# Patient Record
Sex: MALE | Race: WHITE | NOT HISPANIC OR LATINO
[De-identification: names, ages, dates, MRNs, and addresses within clinical notes are randomized per-mention and may not be internally consistent; named-entity substitution may affect disease eponyms.]

---

## 2020-05-07 PROBLEM — Z00.00 ENCOUNTER FOR PREVENTIVE HEALTH EXAMINATION: Status: ACTIVE | Noted: 2020-05-07

## 2020-05-11 ENCOUNTER — APPOINTMENT (OUTPATIENT)
Dept: ORTHOPEDIC SURGERY | Facility: CLINIC | Age: 65
End: 2020-05-11
Payer: MEDICARE

## 2020-05-11 VITALS
WEIGHT: 165 LBS | DIASTOLIC BLOOD PRESSURE: 98 MMHG | BODY MASS INDEX: 24.44 KG/M2 | HEART RATE: 80 BPM | SYSTOLIC BLOOD PRESSURE: 134 MMHG | HEIGHT: 69 IN

## 2020-05-11 DIAGNOSIS — Z85.9 PERSONAL HISTORY OF MALIGNANT NEOPLASM, UNSPECIFIED: ICD-10-CM

## 2020-05-11 DIAGNOSIS — S72.111A DISPLACED FRACTURE OF GREATER TROCHANTER OF RIGHT FEMUR, INITIAL ENCOUNTER FOR CLOSED FRACTURE: ICD-10-CM

## 2020-05-11 PROCEDURE — 99204 OFFICE O/P NEW MOD 45 MIN: CPT

## 2024-01-03 ENCOUNTER — APPOINTMENT (OUTPATIENT)
Dept: INTERNAL MEDICINE | Facility: CLINIC | Age: 69
End: 2024-01-03

## 2024-01-05 ENCOUNTER — APPOINTMENT (OUTPATIENT)
Dept: ORTHOPEDIC SURGERY | Facility: CLINIC | Age: 69
End: 2024-01-05

## 2024-01-12 ENCOUNTER — APPOINTMENT (OUTPATIENT)
Dept: ORTHOPEDIC SURGERY | Facility: CLINIC | Age: 69
End: 2024-01-12

## 2024-01-17 ENCOUNTER — APPOINTMENT (OUTPATIENT)
Dept: ORTHOPEDIC SURGERY | Facility: CLINIC | Age: 69
End: 2024-01-17

## 2024-01-24 ENCOUNTER — APPOINTMENT (OUTPATIENT)
Dept: ORTHOPEDIC SURGERY | Facility: CLINIC | Age: 69
End: 2024-01-24

## 2024-01-29 ENCOUNTER — APPOINTMENT (OUTPATIENT)
Dept: ORTHOPEDIC SURGERY | Facility: CLINIC | Age: 69
End: 2024-01-29

## 2024-01-30 ENCOUNTER — APPOINTMENT (OUTPATIENT)
Dept: ORTHOPEDIC SURGERY | Facility: CLINIC | Age: 69
End: 2024-01-30
Payer: MEDICARE

## 2024-01-30 DIAGNOSIS — M54.50 LOW BACK PAIN, UNSPECIFIED: ICD-10-CM

## 2024-01-30 PROCEDURE — 72110 X-RAY EXAM L-2 SPINE 4/>VWS: CPT

## 2024-01-30 PROCEDURE — 99204 OFFICE O/P NEW MOD 45 MIN: CPT

## 2024-02-05 PROBLEM — M54.50 LUMBAR BACK PAIN: Status: ACTIVE | Noted: 2024-02-05

## 2024-02-05 NOTE — PHYSICAL EXAM
[de-identified] : General: No acute distress, conversant, well-nourished. Head: Normocephalic, atraumatic Neck: trachea midline, FROM Heart: normotensive and normal rate and rhythm Lungs: No labored breathing Skin: No abrasions, no rashes, no edema Psych: Alert and oriented to person, place and time Extremities: no peripheral edema or digital cyanosis Gait: Normal gait. Can perform tandem gait.   Vascular: warm and well perfused distally, palpable distal pulses  MSK: Lumbar spine: No tenderness to palpation.  No step-off, no deformity.  NEURO EXAM: Sensation  Left L2  -  2/2             Left L3  -  2/2 Left L4  -  2/2 Left L5  -  2/2 Left S1  -  2/2  Right L2  -  2/2             Right L3  -  2/2 Right L4  -  2/2 Right L5  -  2/2 Right S1  -  2/2  Motor:  Left L2 (hip flexion)                            5/5                 Left L3 (knee extension)                   5/5                 Left L4 (ankle dorsiflexion)                 5/5                 Left L5 (long toe extensor)                5/5                 Left S1 (ankle plantar flexion)           5/5  Right L2 (hip flexion)                            5/5                 Right L3 (knee extension)                   5/5                 Right L4 (ankle dorsiflexion)                 5/5                 Right L5 (long toe extensor)                5/5                 Right S1 (ankle plantar flexion)           5/5  Reflexes: Normal and symmetric Negative clonus.  Down-going Babinski.    [de-identified] : I ordered radiographs to evaluate the patient's symptoms. Lumbar 4 view radiographs taken in the office today show no dislocation or fracture.  Lumbar spondylosis.  No instability on dynamic series.  I independently reviewed his lumbar MRI which shows disc herniation and stenosis most pronounced at L3-L4.

## 2024-02-05 NOTE — HISTORY OF PRESENT ILLNESS
[de-identified] : 68 year old male presents with low back pain radiating to his legs left worse than right.  He reports numbness in his left leg. He denies recent illness, fevers, weakness, balance problems, saddle anesthesia, urinary retention or fecal incontinence.  He has recent MRI.  He has been doing PT with minimal relief.

## 2024-02-05 NOTE — ASSESSMENT
[FreeTextEntry1] : 68 year old male presents with low back pain radiating to his legs left worse than right.  He reports numbness in his left leg. He denies recent illness, fevers, weakness, balance problems, saddle anesthesia, urinary retention or fecal incontinence.  I independently reviewed his lumbar MRI which shows disc herniation and stenosis most pronounced at L3-L4. We discussed treatment options including physical therapy, medications, spinal injections and surgery.  Surgery would be lumbar decompression and discectomy.  He would like to avoid surgery. The patient was given a referral for consideration for spinal injections.  The patient was given a referral for physical therapy.  Continue PT.  F/U in 4-6 weeks. We discussed red flag symptoms that would require emergent evaluation. He knows to call with any questions or concerns or if his symptoms acutely worsen.

## 2024-02-08 ENCOUNTER — APPOINTMENT (OUTPATIENT)
Dept: ORTHOPEDIC SURGERY | Facility: CLINIC | Age: 69
End: 2024-02-08
Payer: MEDICARE

## 2024-02-08 VITALS
HEIGHT: 69 IN | BODY MASS INDEX: 24.44 KG/M2 | DIASTOLIC BLOOD PRESSURE: 86 MMHG | TEMPERATURE: 98.2 F | HEART RATE: 73 BPM | SYSTOLIC BLOOD PRESSURE: 125 MMHG | OXYGEN SATURATION: 98 % | WEIGHT: 165 LBS

## 2024-02-08 PROCEDURE — 99204 OFFICE O/P NEW MOD 45 MIN: CPT | Mod: 57

## 2024-02-12 RX ORDER — BUPROPION HYDROCHLORIDE 100 MG/1
TABLET, FILM COATED ORAL
Refills: 0 | Status: ACTIVE | COMMUNITY

## 2024-02-12 NOTE — PLAN
[TextEntry] : I agree with the plan for decompressive laminectomy L3-4. The risks, alternative benefits, which explain detailed patient understanding and wish to proceed.

## 2024-02-12 NOTE — HISTORY OF PRESENT ILLNESS
[de-identified] : Mr. Sears has LBP radiating to b/l LEs Lt>Rt since October 2023. Reports pain may have begun after lifting heavy weights. Pt reports he has had similar prior episodes that would resolved after 1 months or after taking a Medrol channing. He reports he has tried Medrol, mobic and PT with no relief this time. Reports LE weakness.   He's been recommended for surgical decompression.

## 2024-02-12 NOTE — END OF VISIT
[FreeTextEntry3] : I Sol Campos, acting as scribe, attest that this documentation has been prepared under the direction and in the presence of Provider Francisco Arias MD.   I was physically present during the service to the patient and/or personally examined the patient and I was directly involved in the management plan and recommendations of the care provided to the patient.   I Dr. Arias, reviewed the history, the physical exam, and plan as documented by the PA. The documentation recorded by the PA, in my presence, accurately reflects the service I personally performed, and the decisions made by me with my edits as appropriate.  Francisco Arias MD

## 2024-02-12 NOTE — PHYSICAL EXAM
[de-identified] : Lumbar MRI: severe lumbar spinal stenosis at L3-4 with clumping of his nerves consistent with the symptoms. He also has degenerative changes at L4-5 with narrowing of the neuroforamina at both levels.

## 2024-02-22 ENCOUNTER — APPOINTMENT (OUTPATIENT)
Dept: ORTHOPEDIC SURGERY | Facility: CLINIC | Age: 69
End: 2024-02-22

## 2024-02-29 ENCOUNTER — APPOINTMENT (OUTPATIENT)
Dept: ORTHOPEDIC SURGERY | Facility: CLINIC | Age: 69
End: 2024-02-29
Payer: MEDICARE

## 2024-02-29 DIAGNOSIS — Z87.39 PERSONAL HISTORY OF OTHER DISEASES OF THE MUSCULOSKELETAL SYSTEM AND CONNECTIVE TISSUE: ICD-10-CM

## 2024-02-29 DIAGNOSIS — M48.062 SPINAL STENOSIS, LUMBAR REGION WITH NEUROGENIC CLAUDICATION: ICD-10-CM

## 2024-02-29 PROCEDURE — 99212 OFFICE O/P EST SF 10 MIN: CPT

## 2024-02-29 NOTE — REASON FOR VISIT
[Follow-Up Visit] : a follow-up visit for [Herniated Discs] : herniated discs [Back Pain] : back pain [Spinal Stenosis] : spinal stenosis

## 2024-03-01 PROBLEM — M48.062 LUMBAR STENOSIS WITH NEUROGENIC CLAUDICATION: Status: ACTIVE | Noted: 2024-01-30

## 2024-03-01 PROBLEM — Z87.39 HISTORY OF ARTHRITIS: Status: RESOLVED | Noted: 2024-02-08 | Resolved: 2024-03-01

## 2024-03-01 NOTE — PLAN
[TextEntry] : - Await patient's decision to proceed with decompressive laminectomy at L3-4   - Continue to provide necessary information and support to the patient as required   - Plan for pre-operative evaluation if the patient decides to proceed with the surgery.

## 2024-03-01 NOTE — ASSESSMENT
[FreeTextEntry1] : Patient seeking information about decompressive laminectomy, will contact the team when ready to proceed with the procedure.

## 2024-03-01 NOTE — HISTORY OF PRESENT ILLNESS
[de-identified] : Nadir is here to discuss the possibility of undergoing a decompressive laminectomy. He had multiple questions regarding the procedure, including the risks, alternatives, and benefits. After having his queries answered, he will contact us when he is ready to proceed.

## 2024-09-03 ENCOUNTER — APPOINTMENT (OUTPATIENT)
Dept: DERMATOLOGY | Facility: CLINIC | Age: 69
End: 2024-09-03

## 2024-10-23 ENCOUNTER — APPOINTMENT (OUTPATIENT)
Dept: INTERNAL MEDICINE | Facility: CLINIC | Age: 69
End: 2024-10-23

## 2024-11-12 ENCOUNTER — APPOINTMENT (OUTPATIENT)
Dept: INTERNAL MEDICINE | Facility: CLINIC | Age: 69
End: 2024-11-12

## 2024-11-12 ENCOUNTER — TRANSCRIPTION ENCOUNTER (OUTPATIENT)
Age: 69
End: 2024-11-12

## 2024-11-20 ENCOUNTER — APPOINTMENT (OUTPATIENT)
Dept: OTOLARYNGOLOGY | Facility: CLINIC | Age: 69
End: 2024-11-20

## 2024-11-21 ENCOUNTER — APPOINTMENT (OUTPATIENT)
Dept: NEUROSURGERY | Facility: CLINIC | Age: 69
End: 2024-11-21

## 2024-12-19 ENCOUNTER — APPOINTMENT (OUTPATIENT)
Dept: ORTHOPEDIC SURGERY | Facility: CLINIC | Age: 69
End: 2024-12-19
Payer: MEDICARE

## 2024-12-19 DIAGNOSIS — M48.02 SPINAL STENOSIS, CERVICAL REGION: ICD-10-CM

## 2024-12-19 DIAGNOSIS — M48.062 SPINAL STENOSIS, LUMBAR REGION WITH NEUROGENIC CLAUDICATION: ICD-10-CM

## 2024-12-19 PROCEDURE — 99212 OFFICE O/P EST SF 10 MIN: CPT | Mod: 57

## 2024-12-20 PROBLEM — M48.02 SPINAL STENOSIS OF CERVICAL REGION: Status: ACTIVE | Noted: 2024-12-20

## 2025-03-13 ENCOUNTER — APPOINTMENT (OUTPATIENT)
Dept: ORTHOPEDIC SURGERY | Facility: CLINIC | Age: 70
End: 2025-03-13
Payer: MEDICARE

## 2025-03-13 ENCOUNTER — NON-APPOINTMENT (OUTPATIENT)
Age: 70
End: 2025-03-13

## 2025-03-13 VITALS
HEIGHT: 69 IN | OXYGEN SATURATION: 96 % | SYSTOLIC BLOOD PRESSURE: 134 MMHG | BODY MASS INDEX: 22.96 KG/M2 | HEART RATE: 73 BPM | WEIGHT: 155 LBS | DIASTOLIC BLOOD PRESSURE: 69 MMHG

## 2025-03-13 DIAGNOSIS — M48.02 SPINAL STENOSIS, CERVICAL REGION: ICD-10-CM

## 2025-03-13 DIAGNOSIS — M48.062 SPINAL STENOSIS, LUMBAR REGION WITH NEUROGENIC CLAUDICATION: ICD-10-CM

## 2025-03-13 PROCEDURE — 99212 OFFICE O/P EST SF 10 MIN: CPT

## 2025-04-10 ENCOUNTER — TRANSCRIPTION ENCOUNTER (OUTPATIENT)
Age: 70
End: 2025-04-10

## 2025-04-10 ENCOUNTER — APPOINTMENT (OUTPATIENT)
Dept: ORTHOPEDIC SURGERY | Facility: CLINIC | Age: 70
End: 2025-04-10
Payer: MEDICARE

## 2025-04-10 DIAGNOSIS — M48.02 SPINAL STENOSIS, CERVICAL REGION: ICD-10-CM

## 2025-04-10 DIAGNOSIS — M48.062 SPINAL STENOSIS, LUMBAR REGION WITH NEUROGENIC CLAUDICATION: ICD-10-CM

## 2025-04-10 PROCEDURE — 99212 OFFICE O/P EST SF 10 MIN: CPT

## 2025-04-10 RX ORDER — DEXTROAMPHETAMINE SACCHARATE, AMPHETAMINE ASPARTATE, DEXTROAMPHETAMINE SULFATE, AND AMPHETAMINE SULFATE 1.25; 1.25; 1.25; 1.25 MG/1; MG/1; MG/1; MG/1
5 TABLET ORAL
Refills: 0 | Status: ACTIVE | COMMUNITY

## 2025-04-22 ENCOUNTER — TRANSCRIPTION ENCOUNTER (OUTPATIENT)
Age: 70
End: 2025-04-22

## 2025-04-22 VITALS
DIASTOLIC BLOOD PRESSURE: 88 MMHG | SYSTOLIC BLOOD PRESSURE: 151 MMHG | HEART RATE: 69 BPM | WEIGHT: 154.32 LBS | TEMPERATURE: 98 F | HEIGHT: 69 IN | RESPIRATION RATE: 16 BRPM | OXYGEN SATURATION: 98 %

## 2025-04-22 RX ORDER — POVIDONE-IODINE 7.5 %
1 SOLUTION, NON-ORAL TOPICAL ONCE
Refills: 0 | Status: COMPLETED | OUTPATIENT
Start: 2025-04-23 | End: 2025-04-23

## 2025-04-22 NOTE — H&P ADULT - NSICDXPASTSURGICALHX_GEN_ALL_CORE_FT
PAST SURGICAL HISTORY:  H/O: knee surgery     Status post open reduction and internal fixation (ORIF) of fracture humerus    Surgery, elective partial resection, native

## 2025-04-22 NOTE — H&P ADULT - NSHPLABSRESULTS_GEN_ALL_CORE
Preop CBC, BMP, PT/INR, PTT within normal range and reviewed per medical clearance  H/H: 14.1/41.3  Cr: 1.45  UA: WNL  Preop EKG - 74 bmp NSR  3M: DOS  T + S: DOS
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
## 2025-04-22 NOTE — H&P ADULT - PROBLEM SELECTOR PLAN 1
Admit to Ortho for elective limited unilateral laminotomy and decompression at L3-L4  Cleared by Dr. Tobin

## 2025-04-22 NOTE — H&P ADULT - NSHPPHYSICALEXAM_GEN_ALL_CORE
Gen: ***, NAD  MSK: Decreased lumbar ROM secondary to pain    Rest of PE per MD clearance Gen:  NAD  MSK: BILATERAL LES skin intact, no erythema/ecchymosis/sts  SLT INTACT, DP/PT 2+, EHL/TA/GS 5/5  Decreased lumbar ROM secondary to pain    Rest of PE per MD clearance

## 2025-04-22 NOTE — PATIENT PROFILE ADULT - NSPROPTRIGHTCAREGIVER_GEN_A_NUR
After Visit Summary:    - no spots of concern on exam, today  - please call for any new or changing skin concerns     Sun protective hats and clothing look for \"UPF\" (like \"SPF\" but for fabric)  Lands Weibu, [employer], UV skinz, Coolibar, Under [place], other brands as well, can order online (amazon.com,etc)       Sunscreen   is recommended daily (SPF 15-30 okay for daily use on face as below, SPF  on face and body when out). \"Elta Md\" makes a very good sunscreen with transparent zinc oxide (available online at dermZonder), neutrogena and blue lizard make excellent sunscreens as well. Bullfrog sunscreen (amazon.com) is a gel-based sunscreen that feels less \"greasy.\" Look for\"broad spectrum\" UVA/UVB coverage, and reapply every 2 hours when out.   For daily use, recommend choosing a moisturizer with SPF in it, apply daily - --I like neutrogena hydroboost with SPF 50          Return for follow up in: as needed, happy to see you any time      Please call with any questions or concerns     Fabiana Negrete MD  [facility]  [phone]    In the next few weeks you may receive a link to an online survey, or a Press Ganey survey by mail, regarding your most recent clinic visit with us.  Please take a few moments to accurately evaluate your visit. We strive to provide you with the best medical care. Your feedback will assist us in achieving this. Again, thank you for your time and we look forward to your next visit.      
no
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
## 2025-04-22 NOTE — PATIENT PROFILE ADULT - NSPROGENSOURCEINFO_GEN_A_NUR
Abdominal pain.  VSS.  Labs, CT, UA findings reviewed.   +acutely elevated pancreatic enzymes, Ct concerning for right sided hydronephrosis of unclear source, +UTI.  Patient receiving pain medication and abx for UTI.  Patient is to be admitted to the hospital and the case was discussed with the admitting physician.  Any changes in plan, additional imaging/labs, and further work up will be at the discretion of the admitting physician.
patient

## 2025-04-23 ENCOUNTER — TRANSCRIPTION ENCOUNTER (OUTPATIENT)
Age: 70
End: 2025-04-23

## 2025-04-23 ENCOUNTER — INPATIENT (INPATIENT)
Facility: HOSPITAL | Age: 70
LOS: 1 days | Discharge: HOME | End: 2025-04-25
Payer: MEDICARE

## 2025-04-23 ENCOUNTER — APPOINTMENT (OUTPATIENT)
Dept: ORTHOPEDIC SURGERY | Facility: HOSPITAL | Age: 70
End: 2025-04-23
Payer: MEDICARE

## 2025-04-23 DIAGNOSIS — M48.061 SPINAL STENOSIS, LUMBAR REGION WITHOUT NEUROGENIC CLAUDICATION: ICD-10-CM

## 2025-04-23 DIAGNOSIS — Z98.890 OTHER SPECIFIED POSTPROCEDURAL STATES: Chronic | ICD-10-CM

## 2025-04-23 DIAGNOSIS — Z41.9 ENCOUNTER FOR PROCEDURE FOR PURPOSES OTHER THAN REMEDYING HEALTH STATE, UNSPECIFIED: Chronic | ICD-10-CM

## 2025-04-23 LAB
BLD GP AB SCN SERPL QL: NEGATIVE — SIGNIFICANT CHANGE UP
RH IG SCN BLD-IMP: POSITIVE — SIGNIFICANT CHANGE UP

## 2025-04-23 PROCEDURE — 63047 LAM FACETEC & FORAMOT LUMBAR: CPT | Mod: AS

## 2025-04-23 PROCEDURE — 63047 LAM FACETEC & FORAMOT LUMBAR: CPT

## 2025-04-23 DEVICE — SURGIFOAM 8 X 12.5CM X 10MM (100): Type: IMPLANTABLE DEVICE | Status: FUNCTIONAL

## 2025-04-23 DEVICE — SURGIFLO HEMOSTATIC MATRIX KIT: Type: IMPLANTABLE DEVICE | Status: FUNCTIONAL

## 2025-04-23 RX ORDER — BUPROPION HYDROBROMIDE 522 MG/1
1 TABLET, EXTENDED RELEASE ORAL
Refills: 0 | DISCHARGE

## 2025-04-23 RX ORDER — CEFAZOLIN SODIUM IN 0.9 % NACL 3 G/100 ML
2000 INTRAVENOUS SOLUTION, PIGGYBACK (ML) INTRAVENOUS EVERY 8 HOURS
Refills: 0 | Status: COMPLETED | OUTPATIENT
Start: 2025-04-23 | End: 2025-04-23

## 2025-04-23 RX ORDER — SODIUM CHLORIDE 9 G/1000ML
1000 INJECTION, SOLUTION INTRAVENOUS
Refills: 0 | Status: DISCONTINUED | OUTPATIENT
Start: 2025-04-23 | End: 2025-04-25

## 2025-04-23 RX ORDER — PROPRANOLOL HCL 60 MG
1 TABLET ORAL
Refills: 0 | DISCHARGE

## 2025-04-23 RX ORDER — HYDROMORPHONE/SOD CHLOR,ISO/PF 2 MG/10 ML
0.5 SYRINGE (ML) INJECTION
Refills: 0 | Status: DISCONTINUED | OUTPATIENT
Start: 2025-04-23 | End: 2025-04-25

## 2025-04-23 RX ORDER — ONDANSETRON HCL/PF 4 MG/2 ML
4 VIAL (ML) INJECTION EVERY 6 HOURS
Refills: 0 | Status: DISCONTINUED | OUTPATIENT
Start: 2025-04-23 | End: 2025-04-24

## 2025-04-23 RX ORDER — METHOCARBAMOL 500 MG/1
500 TABLET, FILM COATED ORAL EVERY 8 HOURS
Refills: 0 | Status: DISCONTINUED | OUTPATIENT
Start: 2025-04-23 | End: 2025-04-25

## 2025-04-23 RX ORDER — OXYCODONE HYDROCHLORIDE 30 MG/1
5 TABLET ORAL EVERY 4 HOURS
Refills: 0 | Status: DISCONTINUED | OUTPATIENT
Start: 2025-04-23 | End: 2025-04-25

## 2025-04-23 RX ORDER — BUPROPION HYDROBROMIDE 522 MG/1
300 TABLET, EXTENDED RELEASE ORAL DAILY
Refills: 0 | Status: DISCONTINUED | OUTPATIENT
Start: 2025-04-23 | End: 2025-04-23

## 2025-04-23 RX ORDER — PREGABALIN 75 MG/1
50 CAPSULE ORAL THREE TIMES A DAY
Refills: 0 | Status: DISCONTINUED | OUTPATIENT
Start: 2025-04-23 | End: 2025-04-25

## 2025-04-23 RX ORDER — RIZATRIPTAN BENZOATE 5 MG/1
1 TABLET ORAL
Refills: 0 | DISCHARGE

## 2025-04-23 RX ORDER — SENNA 187 MG
2 TABLET ORAL AT BEDTIME
Refills: 0 | Status: DISCONTINUED | OUTPATIENT
Start: 2025-04-23 | End: 2025-04-25

## 2025-04-23 RX ORDER — ACETAMINOPHEN 500 MG/5ML
1000 LIQUID (ML) ORAL EVERY 8 HOURS
Refills: 0 | Status: DISCONTINUED | OUTPATIENT
Start: 2025-04-23 | End: 2025-04-25

## 2025-04-23 RX ORDER — POLYETHYLENE GLYCOL 3350 17 G/17G
17 POWDER, FOR SOLUTION ORAL DAILY
Refills: 0 | Status: DISCONTINUED | OUTPATIENT
Start: 2025-04-23 | End: 2025-04-25

## 2025-04-23 RX ORDER — ONDANSETRON HCL/PF 4 MG/2 ML
4 VIAL (ML) INJECTION EVERY 6 HOURS
Refills: 0 | Status: DISCONTINUED | OUTPATIENT
Start: 2025-04-23 | End: 2025-04-25

## 2025-04-23 RX ORDER — HYDROMORPHONE/SOD CHLOR,ISO/PF 2 MG/10 ML
0.5 SYRINGE (ML) INJECTION EVERY 4 HOURS
Refills: 0 | Status: DISCONTINUED | OUTPATIENT
Start: 2025-04-23 | End: 2025-04-25

## 2025-04-23 RX ORDER — HYDROCORTISONE 20 MG
TABLET ORAL
Refills: 0 | DISCHARGE

## 2025-04-23 RX ORDER — ACETAMINOPHEN 500 MG/5ML
1000 LIQUID (ML) ORAL ONCE
Refills: 0 | Status: COMPLETED | OUTPATIENT
Start: 2025-04-23 | End: 2025-04-23

## 2025-04-23 RX ORDER — APREPITANT 40 MG/1
40 CAPSULE ORAL ONCE
Refills: 0 | Status: COMPLETED | OUTPATIENT
Start: 2025-04-23 | End: 2025-04-23

## 2025-04-23 RX ORDER — OMEPRAZOLE 20 MG/1
1 CAPSULE, DELAYED RELEASE ORAL
Refills: 0 | DISCHARGE

## 2025-04-23 RX ORDER — BUPIVACAINE 13.3 MG/ML
20 INJECTION, SUSPENSION, LIPOSOMAL INFILTRATION ONCE
Refills: 0 | Status: DISCONTINUED | OUTPATIENT
Start: 2025-04-23 | End: 2025-04-23

## 2025-04-23 RX ADMIN — Medication 100 MILLIGRAM(S): at 23:09

## 2025-04-23 RX ADMIN — Medication 1000 MILLIGRAM(S): at 13:55

## 2025-04-23 RX ADMIN — Medication 1000 MILLIGRAM(S): at 22:39

## 2025-04-23 RX ADMIN — PREGABALIN 50 MILLIGRAM(S): 75 CAPSULE ORAL at 21:39

## 2025-04-23 RX ADMIN — PREGABALIN 50 MILLIGRAM(S): 75 CAPSULE ORAL at 13:55

## 2025-04-23 RX ADMIN — APREPITANT 40 MILLIGRAM(S): 40 CAPSULE ORAL at 07:04

## 2025-04-23 RX ADMIN — Medication 100 MILLIGRAM(S): at 15:54

## 2025-04-23 RX ADMIN — Medication 2 TABLET(S): at 21:39

## 2025-04-23 RX ADMIN — Medication 4 MILLIGRAM(S): at 12:52

## 2025-04-23 RX ADMIN — METHOCARBAMOL 500 MILLIGRAM(S): 500 TABLET, FILM COATED ORAL at 21:39

## 2025-04-23 RX ADMIN — Medication 1000 MILLIGRAM(S): at 07:04

## 2025-04-23 RX ADMIN — Medication 1 APPLICATION(S): at 07:20

## 2025-04-23 RX ADMIN — Medication 1 APPLICATION(S): at 07:18

## 2025-04-23 RX ADMIN — POLYETHYLENE GLYCOL 3350 17 GRAM(S): 17 POWDER, FOR SOLUTION ORAL at 12:52

## 2025-04-23 RX ADMIN — Medication 0.5 MILLIGRAM(S): at 10:55

## 2025-04-23 RX ADMIN — METHOCARBAMOL 500 MILLIGRAM(S): 500 TABLET, FILM COATED ORAL at 13:55

## 2025-04-23 RX ADMIN — OXYCODONE HYDROCHLORIDE 5 MILLIGRAM(S): 30 TABLET ORAL at 11:50

## 2025-04-23 RX ADMIN — Medication 1000 MILLIGRAM(S): at 21:39

## 2025-04-23 NOTE — DISCHARGE NOTE PROVIDER - NSDCMRMEDTOKEN_GEN_ALL_CORE_FT
calmol 4: supp taken 3 times a day  hydrocortisone: 2.5% cream to affected area  2 to 3 times a day  Inderal 20 mg oral tablet: 1 tab(s) orally once a day  omeprazole 40 mg oral delayed release capsule: 1 cap(s) orally once a day  rizatriptan 10 mg oral tablet: 1 tab(s) orally as needed for prn  Wellbutrin  mg/24 hours oral tablet, extended release: 1 tab(s) orally once a day   acetaminophen 500 mg oral tablet: 2 tab(s) orally every 8 hours as needed for  mild pain  calmol 4: supp taken 3 times a day  Cane : s/p Left laminotomy  hydrocortisone: 2.5% cream to affected area  2 to 3 times a day  Inderal 20 mg oral tablet: 1 tab(s) orally once a day  methocarbamol 500 mg oral tablet: 1 tab(s) orally every 8 hours as needed for  muscle spasm MDD: 3  omeprazole 40 mg oral delayed release capsule: 1 cap(s) orally once a day  oxyCODONE 5 mg oral tablet: 1 tab(s) orally every 4 to 6 hours as needed for Severe Pain (7 - 10) MDD: 6  pregabalin 50 mg oral capsule: 1 cap(s) orally 3 times a day MDD: 3  rizatriptan 10 mg oral tablet: 1 tab(s) orally as needed for prn  Wellbutrin  mg/24 hours oral tablet, extended release: 1 tab(s) orally once a day

## 2025-04-23 NOTE — PHYSICAL THERAPY INITIAL EVALUATION ADULT - GENERAL OBSERVATIONS, REHAB EVAL
Pt received semi-supine in bed in no acute distress +Heplock +1 hemovac +B SCD +spinal dressing C/D/I +IV

## 2025-04-23 NOTE — DISCHARGE NOTE PROVIDER - CARE PROVIDER_API CALL
Francisco Arias  Spine Surgery  [address]  [place] [address]  Phone: [phone]  Fax: [phone]  Follow Up Time:

## 2025-04-23 NOTE — DISCHARGE NOTE PROVIDER - NSDCFUSCHEDAPPT_GEN_ALL_CORE_FT
Francisco Arias  [facility]  ORTHOSURG [address]  Scheduled Appointment: 05/08/2025    Makayla Yeung  [facility]  INTMED [address]  Scheduled Appointment: 06/12/2025

## 2025-04-23 NOTE — PROGRESS NOTE ADULT - SUBJECTIVE AND OBJECTIVE BOX
Orthopedics Post Op Check    Procedure: L3-L4 LAMINOTOMY  Surgeon: ADITYA Lund stable, laying in bed comfortably. Denies CP, SOB, N/V, numbness/tingling in b/l les.     Vital Signs Last 24 Hrs  T(C): 36.2 (23 Apr 2025 09:59), Max: 36.7 (22 Apr 2025 12:20)  T(F): 97.2 (23 Apr 2025 09:59), Max: 98 (22 Apr 2025 12:20)  HR: 66 (23 Apr 2025 10:44) (62 - 72)  BP: 139/80 (23 Apr 2025 10:44) (119/73 - 151/88)  BP(mean): 102 (23 Apr 2025 10:44) (91 - 102)  RR: 20 (23 Apr 2025 10:44) (10 - 20)  SpO2: 95% (23 Apr 2025 10:44) (93% - 98%)    Parameters below as of 23 Apr 2025 10:44  Patient On (Oxygen Delivery Method): room air        General: NAD   Dressing  C/D/I GAUZE/PAPER TAPE   Pulses: DP/PT 2+ B/L LES  SLT: intact   B/L LES  Motor:  EHL/FHL/TA/GS  5/5 b/l les         A/P: 69yoMale POD#0 s/p L3-L4 LAMINOTOMY  - Stable  - Pain Control  - DVT ppx: scds   - Post op abx: ancef   - PT, WBS: wbat b/l les  - F/U AM Labs Orthopedics Post Op Check    Procedure: L3-L4 LAMINOTOMY  Surgeon: ADITYA Lund stable, laying in bed comfortably. Denies CP, SOB, N/V, numbness/tingling in b/l les.     Vital Signs Last 24 Hrs  T(C): 36.2 (23 Apr 2025 09:59), Max: 36.7 (22 Apr 2025 12:20)  T(F): 97.2 (23 Apr 2025 09:59), Max: 98 (22 Apr 2025 12:20)  HR: 66 (23 Apr 2025 10:44) (62 - 72)  BP: 139/80 (23 Apr 2025 10:44) (119/73 - 151/88)  BP(mean): 102 (23 Apr 2025 10:44) (91 - 102)  RR: 20 (23 Apr 2025 10:44) (10 - 20)  SpO2: 95% (23 Apr 2025 10:44) (93% - 98%)    Parameters below as of 23 Apr 2025 10:44  Patient On (Oxygen Delivery Method): room air        General: NAD   Dressing  C/D/I GAUZE/PAPER TAPE with 1 HV  Pulses: DP/PT 2+ B/L LES  SLT: intact   B/L LES  Motor:  EHL/FHL/TA/GS  5/5 b/l les         A/P: 69yoMale POD#0 s/p L3-L4 LAMINOTOMY  - Stable  - Pain Control  - DVT ppx: scds   - Post op abx: ancef   - PT, WBS: wbat b/l les  - F/U AM Labs

## 2025-04-23 NOTE — PRE-ANESTHESIA EVALUATION ADULT - NSANTHAGERD_ENT_A_CORE
Melolabial Interpolation Flap Text: A decision was made to reconstruct the defect utilizing an interpolation axial flap and a staged reconstruction.  A telfa template was made of the defect.  This telfa template was then used to outline the melolabial interpolation flap.  The donor area for the pedicle flap was then injected with anesthesia.  The flap was excised through the skin and subcutaneous tissue down to the layer of the underlying musculature.  The pedicle flap was carefully excised within this deep plane to maintain its blood supply.  The edges of the donor site were undermined.   The donor site was closed in a primary fashion.  The pedicle was then rotated into position and sutured.  Once the tube was sutured into place, adequate blood supply was confirmed with blanching and refill.  The pedicle was then wrapped with xeroform gauze and dressed appropriately with a telfa and gauze bandage to ensure continued blood supply and protect the attached pedicle. Yes

## 2025-04-23 NOTE — DISCHARGE NOTE PROVIDER - HOSPITAL COURSE
Admitted  Surgery Lt laminotomy L3-4   Mckenzie-op Antibiotics  Pain control  DVT prophylaxis  OOB/Physical Therapy

## 2025-04-23 NOTE — PHYSICAL THERAPY INITIAL EVALUATION ADULT - GAIT DEVIATIONS NOTED, PT EVAL
decreased valdez/decreased velocity of limb motion/decreased step length/decreased weight-shifting ability

## 2025-04-23 NOTE — DISCHARGE NOTE PROVIDER - NSDCFUADDINST_GEN_ALL_CORE_FT
ACTIVITY:  Weightbear as tolerated with assistive device. No strenuous activity (bending/twisting), heavy lifting, driving or returning to work until cleared by MD.    DRESSING: Gauze/paper tape  You may shower, Do not soak in bathtubs. Change dressing daily with gauze/tape till post-op day #5, then leave incision open to air.  Keep your incision clean and dry. Do not pick at your incision. Do not apply creams, ointments or oils to your incision until cleared by your surgeon. Do not soak your incision in sitting water (ie tubs, pools, lakes, etc.) until cleared by your surgeon. You may let clean, running water fall over your incision.    MEDICATION:  You have been prescribed medications for pain:  Tylenol for mild to moderate pain. Do not exceed 3,000mg daily.  For more severe pain, take Tylenol with the addition of narcotic pain medication. Take this medication as prescribed. This medication may cause drowsiness or dizziness. Do not operate machinery. This medication may cause constipation.  For any additional medications, follow instructions on the bottle.   Try to have regular bowel movements. Take stool softener or laxative if necessary. You may wish to take Miralax daily until you have regular bowel movements.   If you have a pain management physician, please follow-up with them postoperatively. If you experience any negative side effects of your medications, please call your surgeon's office to discuss.    Follow-up: Call to schedule an appt with Dr. Arias  for follow up, if you have staples or sutures they will be removed in office.Contact your doctor if you experience: fever greater than 101.5, chills, chest pain, difficulty breathing, redness or excessive drainage around the incision.  ACTIVITY:  Weightbear as tolerated with assistive device. No strenuous activity (bending/twisting), heavy lifting, driving or returning to work until cleared by MD.    DRESSING: Aquacel to surgical incision  You may shower, Do not soak in bathtubs. Keep dressing on for 7 days. Then remove, and leave open to air.  Keep your incision clean and dry. Do not pick at your incision. Do not apply creams, ointments or oils to your incision until cleared by your surgeon. Do not soak your incision in sitting water (ie tubs, pools, lakes, etc.) until cleared by your surgeon. You may let clean, running water fall over your incision.  You have gauze/ tegaderm over your drain site. Change gauze/ tegaderm daily, or as needed until drain site heals.    MEDICATION:  You have been prescribed medications for pain:  Tylenol for mild to moderate pain. Do not exceed 3,000mg daily.  For more severe pain, take Tylenol with the addition of narcotic pain medication. Take this medication as prescribed. This medication may cause drowsiness or dizziness. Do not operate machinery. This medication may cause constipation.  For any additional medications, follow instructions on the bottle.   Try to have regular bowel movements. Take stool softener or laxative if necessary. You may wish to take Miralax daily until you have regular bowel movements.   If you have a pain management physician, please follow-up with them postoperatively. If you experience any negative side effects of your medications, please call your surgeon's office to discuss.    Follow-up: Call to schedule an appt with Dr. Arias  for follow up, if you have staples or sutures they will be removed in office.Contact your doctor if you experience: fever greater than 101.5, chills, chest pain, difficulty breathing, redness or excessive drainage around the incision.
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
## 2025-04-23 NOTE — PHYSICAL THERAPY INITIAL EVALUATION ADULT - ADDITIONAL COMMENTS
As per pt, PTA he was independent with functional mobility, ADLs, and IADLs with no AD and is a community ambulator. Has a shower tub.

## 2025-04-24 ENCOUNTER — TRANSCRIPTION ENCOUNTER (OUTPATIENT)
Age: 70
End: 2025-04-24

## 2025-04-24 LAB
ANION GAP SERPL CALC-SCNC: 11 MMOL/L — SIGNIFICANT CHANGE UP (ref 5–17)
BUN SERPL-MCNC: 19 MG/DL — SIGNIFICANT CHANGE UP (ref 7–23)
CALCIUM SERPL-MCNC: 9.3 MG/DL — SIGNIFICANT CHANGE UP (ref 8.4–10.5)
CHLORIDE SERPL-SCNC: 101 MMOL/L — SIGNIFICANT CHANGE UP (ref 96–108)
CO2 SERPL-SCNC: 26 MMOL/L — SIGNIFICANT CHANGE UP (ref 22–31)
CREAT SERPL-MCNC: 1.1 MG/DL — SIGNIFICANT CHANGE UP (ref 0.5–1.3)
EGFR: 73 ML/MIN/1.73M2 — SIGNIFICANT CHANGE UP
EGFR: 73 ML/MIN/1.73M2 — SIGNIFICANT CHANGE UP
GLUCOSE SERPL-MCNC: 117 MG/DL — HIGH (ref 70–99)
HCT VFR BLD CALC: 39 % — SIGNIFICANT CHANGE UP (ref 39–50)
HGB BLD-MCNC: 13.3 G/DL — SIGNIFICANT CHANGE UP (ref 13–17)
MCHC RBC-ENTMCNC: 32.9 PG — SIGNIFICANT CHANGE UP (ref 27–34)
MCHC RBC-ENTMCNC: 34.1 G/DL — SIGNIFICANT CHANGE UP (ref 32–36)
MCV RBC AUTO: 96.5 FL — SIGNIFICANT CHANGE UP (ref 80–100)
NRBC BLD AUTO-RTO: 0 /100 WBCS — SIGNIFICANT CHANGE UP (ref 0–0)
PLATELET # BLD AUTO: 205 K/UL — SIGNIFICANT CHANGE UP (ref 150–400)
POTASSIUM SERPL-MCNC: 4.4 MMOL/L — SIGNIFICANT CHANGE UP (ref 3.5–5.3)
POTASSIUM SERPL-SCNC: 4.4 MMOL/L — SIGNIFICANT CHANGE UP (ref 3.5–5.3)
RBC # BLD: 4.04 M/UL — LOW (ref 4.2–5.8)
RBC # FLD: 12.4 % — SIGNIFICANT CHANGE UP (ref 10.3–14.5)
SODIUM SERPL-SCNC: 138 MMOL/L — SIGNIFICANT CHANGE UP (ref 135–145)
WBC # BLD: 9.55 K/UL — SIGNIFICANT CHANGE UP (ref 3.8–10.5)
WBC # FLD AUTO: 9.55 K/UL — SIGNIFICANT CHANGE UP (ref 3.8–10.5)

## 2025-04-24 PROCEDURE — 99222 1ST HOSP IP/OBS MODERATE 55: CPT

## 2025-04-24 RX ORDER — BUPROPION HYDROBROMIDE 522 MG/1
300 TABLET, EXTENDED RELEASE ORAL DAILY
Refills: 0 | Status: DISCONTINUED | OUTPATIENT
Start: 2025-04-24 | End: 2025-04-25

## 2025-04-24 RX ADMIN — POLYETHYLENE GLYCOL 3350 17 GRAM(S): 17 POWDER, FOR SOLUTION ORAL at 13:13

## 2025-04-24 RX ADMIN — METHOCARBAMOL 500 MILLIGRAM(S): 500 TABLET, FILM COATED ORAL at 15:56

## 2025-04-24 RX ADMIN — Medication 1000 MILLIGRAM(S): at 06:09

## 2025-04-24 RX ADMIN — PREGABALIN 50 MILLIGRAM(S): 75 CAPSULE ORAL at 13:12

## 2025-04-24 RX ADMIN — Medication 1000 MILLIGRAM(S): at 23:29

## 2025-04-24 RX ADMIN — Medication 2 TABLET(S): at 21:32

## 2025-04-24 RX ADMIN — Medication 40 MILLIGRAM(S): at 05:10

## 2025-04-24 RX ADMIN — METHOCARBAMOL 500 MILLIGRAM(S): 500 TABLET, FILM COATED ORAL at 05:09

## 2025-04-24 RX ADMIN — Medication 1000 MILLIGRAM(S): at 05:09

## 2025-04-24 RX ADMIN — Medication 4 MILLIGRAM(S): at 05:10

## 2025-04-24 RX ADMIN — Medication 1000 MILLIGRAM(S): at 13:19

## 2025-04-24 RX ADMIN — PREGABALIN 50 MILLIGRAM(S): 75 CAPSULE ORAL at 21:32

## 2025-04-24 RX ADMIN — Medication 1000 MILLIGRAM(S): at 21:32

## 2025-04-24 RX ADMIN — PREGABALIN 50 MILLIGRAM(S): 75 CAPSULE ORAL at 05:09

## 2025-04-24 RX ADMIN — METHOCARBAMOL 500 MILLIGRAM(S): 500 TABLET, FILM COATED ORAL at 21:32

## 2025-04-24 RX ADMIN — Medication 1000 MILLIGRAM(S): at 13:12

## 2025-04-24 NOTE — PROGRESS NOTE ADULT - SUBJECTIVE AND OBJECTIVE BOX
POST OPERATIVE DAY #: 1  STATUS POST: L3-L4 left laminectomy     SUBJECTIVE: Patient seen and examined. States pain is tolerable. Able to ambulate freely without significant pain or weakness. Voiding freely, no BM.  Denies any sob/cp/n/v/numbness or tingling in b/l     OBJECTIVE:   Vital Signs Last 24 Hrs  T(C): 36.4 (24 Apr 2025 08:30), Max: 36.8 (23 Apr 2025 20:59)  T(F): 97.6 (24 Apr 2025 08:30), Max: 98.2 (23 Apr 2025 20:59)  HR: 59 (24 Apr 2025 08:30) (59 - 66)  BP: 125/76 (24 Apr 2025 08:30) (113/64 - 134/81)  BP(mean): --  RR: 18 (24 Apr 2025 08:30) (16 - 18)  SpO2: 99% (24 Apr 2025 08:30) (95% - 99%)    Parameters below as of 24 Apr 2025 08:30  Patient On (Oxygen Delivery Method): room air    General: A&Ox3, NAD, laying comfortably in bed.    Affected extremity: Bilateral lower extremities         Dressing: clean/dry/intact, covered with gauze and papertape. Hemovac drain x1.         Sensation: intact to light touch to patient's baseline bilaterally         Motor exam: EHL/TA/GS 5/5 bilaterally         Pulses: 2+ DP and PT bilaterally            I&O's Detail    23 Apr 2025 07:01  -  24 Apr 2025 07:00  --------------------------------------------------------  IN:  Total IN: 0 mL    OUT:    Accordian (mL): 70 mL    Voided (mL): 3050 mL  Total OUT: 3120 mL    Total NET: -3120 mL          LABS:                        13.3   9.55  )-----------( 205      ( 24 Apr 2025 10:00 )             39.0     04-24    138  |  101  |  19  ----------------------------<  117[H]  4.4   |  26  |  1.10    Ca    9.3      24 Apr 2025 10:00    Urinalysis Basic - ( 24 Apr 2025 10:00 )  Color: x / Appearance: x / SG: x / pH: x  Gluc: 117 mg/dL / Ketone: x  / Bili: x / Urobili: x   Blood: x / Protein: x / Nitrite: x   Leuk Esterase: x / RBC: x / WBC x   Sq Epi: x / Non Sq Epi: x / Bacteria: x    MEDICATIONS:  acetaminophen     Tablet .. 1000 milliGRAM(s) Oral every 8 hours  buPROPion XL (24-Hour) . 300 milliGRAM(s) Oral daily  HYDROmorphone  Injectable 0.5 milliGRAM(s) IV Push every 4 hours PRN  HYDROmorphone  Injectable 0.5 milliGRAM(s) IV Push every 15 minutes PRN  methocarbamol 500 milliGRAM(s) Oral every 8 hours  ondansetron Injectable 4 milliGRAM(s) IV Push every 6 hours PRN  oxyCODONE    IR 5 milliGRAM(s) Oral every 4 hours PRN  pregabalin 50 milliGRAM(s) Oral three times a day      ASSESSMENT AND PLAN: 68yo Male POD#1 L3-L4 left laminectomy   - Stable, appreciate recommendations from medicine  - Analgesic pain control  - Drain: Hemovac x1. F/u with output this afternoon for possible removal  - DVT prophylaxis: SCDs  - Weight Bearing Status:  Weight bearing as tolerated  - Disposition: Cleared by PT, plan for home d/c. Patient requesting to stay for 3 days because he does not want to be "rushed out" and his insurance covers a stay for 5 days.  POST OPERATIVE DAY #: 1  STATUS POST: L3-L4 left laminectomy     SUBJECTIVE: Patient seen and examined. States pain is tolerable. Patient states his legs feel "different" and "better". Able to ambulate freely without significant pain or weakness. Voiding freely, last BM yesterday. Patient complains of mild dizziness and lightheadedness. States he also needs cervical surgery and attributes the dizziness and lightheadedness to that.   Denies any sob/cp/n/v/numbness or tingling in b/l LE    OBJECTIVE:   Vital Signs Last 24 Hrs  T(C): 36.4 (24 Apr 2025 08:30), Max: 36.8 (23 Apr 2025 20:59)  T(F): 97.6 (24 Apr 2025 08:30), Max: 98.2 (23 Apr 2025 20:59)  HR: 59 (24 Apr 2025 08:30) (59 - 66)  BP: 125/76 (24 Apr 2025 08:30) (113/64 - 134/81)  BP(mean): --  RR: 18 (24 Apr 2025 08:30) (16 - 18)  SpO2: 99% (24 Apr 2025 08:30) (95% - 99%)    Parameters below as of 24 Apr 2025 08:30  Patient On (Oxygen Delivery Method): room air    General: A&Ox3, NAD, laying comfortably in bed.    Affected extremity: Bilateral lower extremities. No erythema, ecchymosis, or edema appreciated.          Dressing: clean/dry/intact, covered with gauze and papertape. Hemovac drain x1.         Sensation: intact to light touch to patient's baseline bilaterally         Motor exam: EHL/TA/GS 5/5 bilaterally         Pulses: 2+ DP and PT bilaterally            I&O's Detail    23 Apr 2025 07:01  -  24 Apr 2025 07:00  --------------------------------------------------------  IN:  Total IN: 0 mL    OUT:    Accordian (mL): 70 mL    Voided (mL): 3050 mL  Total OUT: 3120 mL    Total NET: -3120 mL          LABS:                        13.3   9.55  )-----------( 205      ( 24 Apr 2025 10:00 )             39.0     04-24    138  |  101  |  19  ----------------------------<  117[H]  4.4   |  26  |  1.10    Ca    9.3      24 Apr 2025 10:00    Urinalysis Basic - ( 24 Apr 2025 10:00 )  Color: x / Appearance: x / SG: x / pH: x  Gluc: 117 mg/dL / Ketone: x  / Bili: x / Urobili: x   Blood: x / Protein: x / Nitrite: x   Leuk Esterase: x / RBC: x / WBC x   Sq Epi: x / Non Sq Epi: x / Bacteria: x    MEDICATIONS:  acetaminophen     Tablet .. 1000 milliGRAM(s) Oral every 8 hours  buPROPion XL (24-Hour) . 300 milliGRAM(s) Oral daily  HYDROmorphone  Injectable 0.5 milliGRAM(s) IV Push every 4 hours PRN  HYDROmorphone  Injectable 0.5 milliGRAM(s) IV Push every 15 minutes PRN  methocarbamol 500 milliGRAM(s) Oral every 8 hours  ondansetron Injectable 4 milliGRAM(s) IV Push every 6 hours PRN  oxyCODONE    IR 5 milliGRAM(s) Oral every 4 hours PRN  pregabalin 50 milliGRAM(s) Oral three times a day      ASSESSMENT AND PLAN: 70yo Male POD#1 L3-L4 left laminectomy   - Stable, appreciate recommendations from medicine  - Analgesic pain control  - Dizziness and lightheadedness: Discussed with Dr. Charles (Internal Medicine). Will order Meclizine PRN  - Drain: Hemovac x1  - DVT prophylaxis: SCDs  - Weight Bearing Status:  Weight bearing as tolerated  - Disposition: Cleared by PT, plan for home d/c. Patient would like to stay another day. Dr. Arias aware.  POST OPERATIVE DAY #: 1  STATUS POST: L3-L4 left laminectomy     SUBJECTIVE: Patient seen and examined. States pain is tolerable. Patient states his legs feel "different" and "better". Able to ambulate freely without significant pain or weakness. Voiding freely, last BM yesterday. Patient complains of mild dizziness and lightheadedness. States he also needs cervical surgery and attributes the dizziness and lightheadedness to that.   Denies any sob/cp/n/v/numbness or tingling in b/l LE    OBJECTIVE:   Vital Signs Last 24 Hrs  T(C): 36.4 (24 Apr 2025 08:30), Max: 36.8 (23 Apr 2025 20:59)  T(F): 97.6 (24 Apr 2025 08:30), Max: 98.2 (23 Apr 2025 20:59)  HR: 59 (24 Apr 2025 08:30) (59 - 66)  BP: 125/76 (24 Apr 2025 08:30) (113/64 - 134/81)  BP(mean): --  RR: 18 (24 Apr 2025 08:30) (16 - 18)  SpO2: 99% (24 Apr 2025 08:30) (95% - 99%)    Parameters below as of 24 Apr 2025 08:30  Patient On (Oxygen Delivery Method): room air    General: A&Ox3, NAD, laying comfortably in bed.    Affected extremity: Bilateral lower extremities. No erythema, ecchymosis, or edema appreciated.          Dressing: clean/dry/intact, covered with gauze and papertape. Hemovac drain x1.         Sensation: intact to light touch to patient's baseline bilaterally         Motor exam: EHL/TA/GS 5/5 bilaterally         Pulses: 2+ DP and PT bilaterally            I&O's Detail    23 Apr 2025 07:01  -  24 Apr 2025 07:00  --------------------------------------------------------  IN:  Total IN: 0 mL    OUT:    Accordian (mL): 70 mL    Voided (mL): 3050 mL  Total OUT: 3120 mL    Total NET: -3120 mL          LABS:                        13.3   9.55  )-----------( 205      ( 24 Apr 2025 10:00 )             39.0     04-24    138  |  101  |  19  ----------------------------<  117[H]  4.4   |  26  |  1.10    Ca    9.3      24 Apr 2025 10:00    Urinalysis Basic - ( 24 Apr 2025 10:00 )  Color: x / Appearance: x / SG: x / pH: x  Gluc: 117 mg/dL / Ketone: x  / Bili: x / Urobili: x   Blood: x / Protein: x / Nitrite: x   Leuk Esterase: x / RBC: x / WBC x   Sq Epi: x / Non Sq Epi: x / Bacteria: x    MEDICATIONS:  acetaminophen     Tablet .. 1000 milliGRAM(s) Oral every 8 hours  buPROPion XL (24-Hour) . 300 milliGRAM(s) Oral daily  HYDROmorphone  Injectable 0.5 milliGRAM(s) IV Push every 4 hours PRN  HYDROmorphone  Injectable 0.5 milliGRAM(s) IV Push every 15 minutes PRN  methocarbamol 500 milliGRAM(s) Oral every 8 hours  ondansetron Injectable 4 milliGRAM(s) IV Push every 6 hours PRN  oxyCODONE    IR 5 milliGRAM(s) Oral every 4 hours PRN  pregabalin 50 milliGRAM(s) Oral three times a day      ASSESSMENT AND PLAN: 68yo Male POD#1 L3-L4 left laminectomy   - Stable, appreciate recommendations from medicine  - Analgesic pain control  - Dizziness and lightheadedness: Discussed with Dr. Charles (Internal Medicine). Can order Meclizine PRN  - Drain: Hemovac x1  - DVT prophylaxis: SCDs  - Weight Bearing Status:  Weight bearing as tolerated  - Disposition: Cleared by PT, plan for home d/c. Patient would like to stay another day. Dr. Arias aware.     I have personally seen and examined the patient. I have reviewed the above clinical note and all of its components. I have made amendments to the documentation where necessary, and agree with the history, physical exam, and plan as documented by the student.   Gregg Lin M.S., PA-C

## 2025-04-24 NOTE — PROGRESS NOTE ADULT - SUBJECTIVE AND OBJECTIVE BOX
SUBJECTIVE: Pt seen and examined on morning rounds. Pt denies cp/sob/n/v/ha    Vital Signs Last 24 Hrs  T(C): 36.6 (24 Apr 2025 05:06), Max: 36.8 (23 Apr 2025 20:59)  T(F): 97.9 (24 Apr 2025 05:06), Max: 98.2 (23 Apr 2025 20:59)  HR: 66 (24 Apr 2025 05:06) (56 - 72)  BP: 131/74 (24 Apr 2025 05:06) (113/64 - 139/80)  BP(mean): 92 (23 Apr 2025 11:29) (91 - 102)  RR: 18 (24 Apr 2025 05:06) (10 - 20)  SpO2: 95% (24 Apr 2025 05:06) (93% - 97%)    Parameters below as of 24 Apr 2025 05:06  Patient On (Oxygen Delivery Method): room air    Physical Exam:  General: NAD, resting comfortably in bed  Dressing C/D/I gauze/paper tape, HV site c/d/i holding suction  Motor:     RLE:        Q 5/5; HS 5/5; TA 5/5; GS 5/5; EHL 5/5; FHL 5/5     LLE:        Q 5/5; HS 5/5; TA 5/5; GS 5/5; EHL 5/5; FHL 5/5  Sensory:     Sensation intact to light touch in L2-S1 dermatomes bilaterally  Vascular:     No edema, calf tenderness, wwp, peripheral pulses +2 bilaterally    Assessment/Plan:  69yM s/p L3-L4 left laminotomy by Dr. YASIR Arias on 04-23  - Weight Bearing Status: WBAT  - Pain control  - DVT PPx: SCDs  - PT rec: No skilled PT needs  - F/u AM labs  - f/u HV output
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
## 2025-04-24 NOTE — DISCHARGE NOTE NURSING/CASE MANAGEMENT/SOCIAL WORK - FINANCIAL ASSISTANCE
[facility] provides services at a reduced cost to those who are determined to be eligible through [facility]’s financial assistance program. Information regarding [facility]’s financial assistance program can be found by going to [email or web address] or by calling [phone].
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
## 2025-04-24 NOTE — DISCHARGE NOTE NURSING/CASE MANAGEMENT/SOCIAL WORK - PATIENT PORTAL LINK FT
You can access the FollowMyHealth Patient Portal offered by [facility] by registering at the following website: [email or web address]. By joining [facility]’s FollowMyHealth portal, you will also be able to view your health information using other applications (apps) compatible with our system.

## 2025-04-25 ENCOUNTER — NON-APPOINTMENT (OUTPATIENT)
Age: 70
End: 2025-04-25

## 2025-04-25 VITALS
SYSTOLIC BLOOD PRESSURE: 125 MMHG | RESPIRATION RATE: 17 BRPM | HEART RATE: 63 BPM | OXYGEN SATURATION: 96 % | DIASTOLIC BLOOD PRESSURE: 78 MMHG | TEMPERATURE: 98 F

## 2025-04-25 LAB
ANION GAP SERPL CALC-SCNC: 8 MMOL/L — SIGNIFICANT CHANGE UP (ref 5–17)
BUN SERPL-MCNC: 20 MG/DL — SIGNIFICANT CHANGE UP (ref 7–23)
CALCIUM SERPL-MCNC: 9.4 MG/DL — SIGNIFICANT CHANGE UP (ref 8.4–10.5)
CHLORIDE SERPL-SCNC: 103 MMOL/L — SIGNIFICANT CHANGE UP (ref 96–108)
CO2 SERPL-SCNC: 28 MMOL/L — SIGNIFICANT CHANGE UP (ref 22–31)
CREAT SERPL-MCNC: 1.21 MG/DL — SIGNIFICANT CHANGE UP (ref 0.5–1.3)
EGFR: 65 ML/MIN/1.73M2 — SIGNIFICANT CHANGE UP
EGFR: 65 ML/MIN/1.73M2 — SIGNIFICANT CHANGE UP
GLUCOSE SERPL-MCNC: 85 MG/DL — SIGNIFICANT CHANGE UP (ref 70–99)
HCT VFR BLD CALC: 38.1 % — LOW (ref 39–50)
HGB BLD-MCNC: 12.8 G/DL — LOW (ref 13–17)
MCHC RBC-ENTMCNC: 33.6 G/DL — SIGNIFICANT CHANGE UP (ref 32–36)
MCHC RBC-ENTMCNC: 33.8 PG — SIGNIFICANT CHANGE UP (ref 27–34)
MCV RBC AUTO: 100.5 FL — HIGH (ref 80–100)
NRBC BLD AUTO-RTO: 0 /100 WBCS — SIGNIFICANT CHANGE UP (ref 0–0)
PLATELET # BLD AUTO: 184 K/UL — SIGNIFICANT CHANGE UP (ref 150–400)
POTASSIUM SERPL-MCNC: 4.4 MMOL/L — SIGNIFICANT CHANGE UP (ref 3.5–5.3)
POTASSIUM SERPL-SCNC: 4.4 MMOL/L — SIGNIFICANT CHANGE UP (ref 3.5–5.3)
RBC # BLD: 3.79 M/UL — LOW (ref 4.2–5.8)
RBC # FLD: 12.8 % — SIGNIFICANT CHANGE UP (ref 10.3–14.5)
SODIUM SERPL-SCNC: 139 MMOL/L — SIGNIFICANT CHANGE UP (ref 135–145)
WBC # BLD: 6.73 K/UL — SIGNIFICANT CHANGE UP (ref 3.8–10.5)
WBC # FLD AUTO: 6.73 K/UL — SIGNIFICANT CHANGE UP (ref 3.8–10.5)

## 2025-04-25 PROCEDURE — 76000 FLUOROSCOPY <1 HR PHYS/QHP: CPT

## 2025-04-25 PROCEDURE — 97161 PT EVAL LOW COMPLEX 20 MIN: CPT

## 2025-04-25 PROCEDURE — C9399: CPT

## 2025-04-25 PROCEDURE — 86901 BLOOD TYPING SEROLOGIC RH(D): CPT

## 2025-04-25 PROCEDURE — C1889: CPT

## 2025-04-25 PROCEDURE — 80048 BASIC METABOLIC PNL TOTAL CA: CPT

## 2025-04-25 PROCEDURE — 97116 GAIT TRAINING THERAPY: CPT

## 2025-04-25 PROCEDURE — 85027 COMPLETE CBC AUTOMATED: CPT

## 2025-04-25 PROCEDURE — 86850 RBC ANTIBODY SCREEN: CPT

## 2025-04-25 PROCEDURE — 86900 BLOOD TYPING SEROLOGIC ABO: CPT

## 2025-04-25 PROCEDURE — 36415 COLL VENOUS BLD VENIPUNCTURE: CPT

## 2025-04-25 RX ORDER — PREGABALIN 50 MG/1
50 CAPSULE ORAL
Qty: 30 | Refills: 0 | Status: ACTIVE | COMMUNITY
Start: 2025-04-25 | End: 1900-01-01

## 2025-04-25 RX ORDER — MECLIZINE HCL 12.5 MG
12.5 TABLET ORAL ONCE
Refills: 0 | Status: DISCONTINUED | OUTPATIENT
Start: 2025-04-25 | End: 2025-04-25

## 2025-04-25 RX ORDER — PREGABALIN 75 MG/1
1 CAPSULE ORAL
Qty: 42 | Refills: 0
Start: 2025-04-25 | End: 2025-05-08

## 2025-04-25 RX ORDER — OXYCODONE HYDROCHLORIDE 30 MG/1
1 TABLET ORAL
Qty: 30 | Refills: 0
Start: 2025-04-25 | End: 2025-04-29

## 2025-04-25 RX ORDER — BISACODYL 5 MG
10 TABLET, DELAYED RELEASE (ENTERIC COATED) ORAL DAILY
Refills: 0 | Status: DISCONTINUED | OUTPATIENT
Start: 2025-04-25 | End: 2025-04-25

## 2025-04-25 RX ORDER — ACETAMINOPHEN 500 MG/5ML
2 LIQUID (ML) ORAL
Qty: 42 | Refills: 0
Start: 2025-04-25 | End: 2025-05-01

## 2025-04-25 RX ORDER — BISACODYL 5 MG
5 TABLET, DELAYED RELEASE (ENTERIC COATED) ORAL EVERY 12 HOURS
Refills: 0 | Status: DISCONTINUED | OUTPATIENT
Start: 2025-04-25 | End: 2025-04-25

## 2025-04-25 RX ORDER — METHOCARBAMOL 500 MG/1
1 TABLET, FILM COATED ORAL
Qty: 21 | Refills: 0
Start: 2025-04-25 | End: 2025-05-01

## 2025-04-25 RX ADMIN — METHOCARBAMOL 500 MILLIGRAM(S): 500 TABLET, FILM COATED ORAL at 05:10

## 2025-04-25 RX ADMIN — BUPROPION HYDROBROMIDE 300 MILLIGRAM(S): 522 TABLET, EXTENDED RELEASE ORAL at 05:09

## 2025-04-25 RX ADMIN — Medication 1000 MILLIGRAM(S): at 06:17

## 2025-04-25 RX ADMIN — Medication 1000 MILLIGRAM(S): at 05:09

## 2025-04-25 RX ADMIN — PREGABALIN 50 MILLIGRAM(S): 75 CAPSULE ORAL at 05:09

## 2025-04-25 RX ADMIN — Medication 5 MILLIGRAM(S): at 06:47

## 2025-04-25 RX ADMIN — Medication 40 MILLIGRAM(S): at 05:10

## 2025-04-25 NOTE — PROGRESS NOTE ADULT - SUBJECTIVE AND OBJECTIVE BOX
SUBJECTIVE: Pt seen and examined on morning rounds. Pt denies cp/sob/n/v/ha    Vital Signs Last 24 Hrs  T(C): 36.4 (25 Apr 2025 04:52), Max: 36.6 (24 Apr 2025 16:02)  T(F): 97.5 (25 Apr 2025 04:52), Max: 97.9 (24 Apr 2025 22:00)  HR: 58 (25 Apr 2025 04:52) (58 - 66)  BP: 115/72 (25 Apr 2025 04:52) (112/70 - 125/76)  BP(mean): --  RR: 18 (25 Apr 2025 04:52) (18 - 18)  SpO2: 95% (25 Apr 2025 04:52) (95% - 99%)    Parameters below as of 25 Apr 2025 04:52  Patient On (Oxygen Delivery Method): room air        Physical Exam:  General: NAD, resting comfortably in bed  Dressing C/D/I gauze/paper tape, HV site c/d/i holding suction  Motor:     RLE:        Q 5/5; HS 5/5; TA 5/5; GS 5/5; EHL 5/5; FHL 5/5     LLE:        Q 5/5; HS 5/5; TA 5/5; GS 5/5; EHL 5/5; FHL 5/5  Sensory:     Sensation intact to light touch in L2-S1 dermatomes bilaterally  Vascular:     No edema, calf tenderness, wwp, peripheral pulses +2 bilaterally    Assessment/Plan:  69yM s/p L3-L4 left laminotomy by Dr. YASIR Arias on 04-23  - Weight Bearing Status: WBAT  - Pain control  - DVT PPx: SCDs  - PT rec: No skilled PT needs  - F/u AM labs  - f/u HV output

## 2025-04-25 NOTE — PROGRESS NOTE ADULT - SUBJECTIVE AND OBJECTIVE BOX
POST OPERATIVE DAY #: 2  STATUS POST:          L3-L4 left laminectomy           SUBJECTIVE: Patient seen and examined.   Denies any sob/cp/n/v/numbness or tingling in b/l     OBJECTIVE:     Vital Signs Last 24 Hrs  T(C): 36.6 (25 Apr 2025 08:57), Max: 36.6 (24 Apr 2025 16:02)  T(F): 97.8 (25 Apr 2025 08:57), Max: 97.9 (24 Apr 2025 22:00)  HR: 63 (25 Apr 2025 08:57) (58 - 66)  BP: 125/78 (25 Apr 2025 08:57) (112/70 - 125/78)  BP(mean): --  RR: 17 (25 Apr 2025 08:57) (17 - 18)  SpO2: 96% (25 Apr 2025 08:57) (95% - 96%)    Parameters below as of 25 Apr 2025 08:57  Patient On (Oxygen Delivery Method): room air        General:  NAD, sitting in chair, also seen walking up/down hallways   Affected extremity: Bilateral lower extremities. No erythema, ecchymosis, or edema appreciated.          Dressing: clean/dry/intact, covered with gauze and papertape. Hemovac drain x1.         Sensation: intact to light touch to patient's baseline bilaterally         Motor exam: EHL/TA/GS 5/5 bilaterally         Pulses: 2+ DP and PT bilaterally              I&O's Detail    24 Apr 2025 07:01  -  25 Apr 2025 07:00  --------------------------------------------------------  IN:  Total IN: 0 mL    OUT:    Accordian (mL): 35 mL    Voided (mL): 500 mL  Total OUT: 535 mL    Total NET: -535 mL          LABS:                        12.8   6.73  )-----------( 184      ( 25 Apr 2025 05:30 )             38.1     04-25    139  |  103  |  20  ----------------------------<  85  4.4   |  28  |  1.21    Ca    9.4      25 Apr 2025 05:30        Urinalysis Basic - ( 25 Apr 2025 05:30 )    Color: x / Appearance: x / SG: x / pH: x  Gluc: 85 mg/dL / Ketone: x  / Bili: x / Urobili: x   Blood: x / Protein: x / Nitrite: x   Leuk Esterase: x / RBC: x / WBC x   Sq Epi: x / Non Sq Epi: x / Bacteria: x        MEDICATIONS:    acetaminophen     Tablet .. 1000 milliGRAM(s) Oral every 8 hours  buPROPion XL (24-Hour) . 300 milliGRAM(s) Oral daily  HYDROmorphone  Injectable 0.5 milliGRAM(s) IV Push every 4 hours PRN  HYDROmorphone  Injectable 0.5 milliGRAM(s) IV Push every 15 minutes PRN  methocarbamol 500 milliGRAM(s) Oral every 8 hours  ondansetron Injectable 4 milliGRAM(s) IV Push every 6 hours PRN  oxyCODONE    IR 5 milliGRAM(s) Oral every 4 hours PRN  pregabalin 50 milliGRAM(s) Oral three times a day          ASSESSMENT AND PLAN: 70yo Male s/p  L3-L4 left laminectomy     - Analgesic pain control  - Drain: Hemovac x1 dc today using aseptic technique. Pt. tolerated well. Aquacel placed over incision site.   - DVT prophylaxis: SCDs  - Weight Bearing Status:  Weight bearing as tolerated  - Disposition: Dc home today. Rx given for cane.

## 2025-04-28 ENCOUNTER — TRANSCRIPTION ENCOUNTER (OUTPATIENT)
Age: 70
End: 2025-04-28

## 2025-04-29 DIAGNOSIS — Z85.51 PERSONAL HISTORY OF MALIGNANT NEOPLASM OF BLADDER: ICD-10-CM

## 2025-04-29 DIAGNOSIS — G43.909 MIGRAINE, UNSPECIFIED, NOT INTRACTABLE, WITHOUT STATUS MIGRAINOSUS: ICD-10-CM

## 2025-04-29 DIAGNOSIS — I10 ESSENTIAL (PRIMARY) HYPERTENSION: ICD-10-CM

## 2025-04-29 DIAGNOSIS — M48.062 SPINAL STENOSIS, LUMBAR REGION WITH NEUROGENIC CLAUDICATION: ICD-10-CM

## 2025-04-29 DIAGNOSIS — M47.26 OTHER SPONDYLOSIS WITH RADICULOPATHY, LUMBAR REGION: ICD-10-CM

## 2025-04-29 DIAGNOSIS — F41.9 ANXIETY DISORDER, UNSPECIFIED: ICD-10-CM

## 2025-04-29 DIAGNOSIS — K21.9 GASTRO-ESOPHAGEAL REFLUX DISEASE WITHOUT ESOPHAGITIS: ICD-10-CM

## 2025-04-29 PROBLEM — C67.9 MALIGNANT NEOPLASM OF BLADDER, UNSPECIFIED: Chronic | Status: ACTIVE | Noted: 2025-04-22

## 2025-04-29 PROBLEM — Z85.528 PERSONAL HISTORY OF OTHER MALIGNANT NEOPLASM OF KIDNEY: Chronic | Status: ACTIVE | Noted: 2025-04-22

## 2025-04-29 PROBLEM — S02.2XXA FRACTURE OF NASAL BONES, INITIAL ENCOUNTER FOR CLOSED FRACTURE: Chronic | Status: ACTIVE | Noted: 2025-04-22

## 2025-04-29 PROBLEM — Z87.39 PERSONAL HISTORY OF OTHER DISEASES OF THE MUSCULOSKELETAL SYSTEM AND CONNECTIVE TISSUE: Chronic | Status: ACTIVE | Noted: 2025-04-22

## 2025-04-29 PROBLEM — S09.92XA UNSPECIFIED INJURY OF NOSE, INITIAL ENCOUNTER: Chronic | Status: ACTIVE | Noted: 2025-04-22

## 2025-04-29 PROBLEM — Z86.59 PERSONAL HISTORY OF OTHER MENTAL AND BEHAVIORAL DISORDERS: Chronic | Status: ACTIVE | Noted: 2025-04-22

## 2025-04-29 PROBLEM — M54.16 RADICULOPATHY, LUMBAR REGION: Chronic | Status: ACTIVE | Noted: 2025-04-22

## 2025-05-01 ENCOUNTER — APPOINTMENT (OUTPATIENT)
Dept: ORTHOPEDIC SURGERY | Facility: CLINIC | Age: 70
End: 2025-05-01
Payer: MEDICARE

## 2025-05-01 VITALS
WEIGHT: 155 LBS | BODY MASS INDEX: 22.96 KG/M2 | HEART RATE: 81 BPM | DIASTOLIC BLOOD PRESSURE: 87 MMHG | OXYGEN SATURATION: 97 % | SYSTOLIC BLOOD PRESSURE: 124 MMHG | HEIGHT: 69 IN

## 2025-05-01 PROBLEM — Z98.890 HISTORY OF LUMBAR LAMINECTOMY: Status: ACTIVE | Noted: 2025-05-01

## 2025-05-01 PROCEDURE — 99024 POSTOP FOLLOW-UP VISIT: CPT

## 2025-05-08 ENCOUNTER — APPOINTMENT (OUTPATIENT)
Dept: ORTHOPEDIC SURGERY | Facility: CLINIC | Age: 70
End: 2025-05-08
Payer: MEDICARE

## 2025-05-08 VITALS
OXYGEN SATURATION: 97 % | HEART RATE: 86 BPM | WEIGHT: 155 LBS | BODY MASS INDEX: 22.96 KG/M2 | DIASTOLIC BLOOD PRESSURE: 71 MMHG | HEIGHT: 69 IN | TEMPERATURE: 97.2 F | SYSTOLIC BLOOD PRESSURE: 107 MMHG

## 2025-05-08 DIAGNOSIS — M48.062 SPINAL STENOSIS, LUMBAR REGION WITH NEUROGENIC CLAUDICATION: ICD-10-CM

## 2025-05-08 DIAGNOSIS — Z98.890 OTHER SPECIFIED POSTPROCEDURAL STATES: ICD-10-CM

## 2025-05-08 PROCEDURE — 99024 POSTOP FOLLOW-UP VISIT: CPT

## 2025-05-08 PROCEDURE — 72100 X-RAY EXAM L-S SPINE 2/3 VWS: CPT

## 2025-05-13 ENCOUNTER — TRANSCRIPTION ENCOUNTER (OUTPATIENT)
Age: 70
End: 2025-05-13

## 2025-05-16 ENCOUNTER — TRANSCRIPTION ENCOUNTER (OUTPATIENT)
Age: 70
End: 2025-05-16

## 2025-05-28 ENCOUNTER — APPOINTMENT (OUTPATIENT)
Dept: DERMATOLOGY | Facility: CLINIC | Age: 70
End: 2025-05-28
Payer: MEDICARE

## 2025-05-28 DIAGNOSIS — D48.5 NEOPLASM OF UNCERTAIN BEHAVIOR OF SKIN: ICD-10-CM

## 2025-05-28 PROCEDURE — 11102 TANGNTL BX SKIN SINGLE LES: CPT

## 2025-05-28 PROCEDURE — 11103 TANGNTL BX SKIN EA SEP/ADDL: CPT

## 2025-05-29 ENCOUNTER — TRANSCRIPTION ENCOUNTER (OUTPATIENT)
Age: 70
End: 2025-05-29

## 2025-06-02 ENCOUNTER — NON-APPOINTMENT (OUTPATIENT)
Age: 70
End: 2025-06-02

## 2025-06-02 LAB — DERMATOLOGY BIOPSY: NORMAL

## 2025-06-05 ENCOUNTER — APPOINTMENT (OUTPATIENT)
Dept: ORTHOPEDIC SURGERY | Facility: CLINIC | Age: 70
End: 2025-06-05
Payer: MEDICARE

## 2025-06-05 VITALS
DIASTOLIC BLOOD PRESSURE: 78 MMHG | OXYGEN SATURATION: 97 % | WEIGHT: 155 LBS | SYSTOLIC BLOOD PRESSURE: 120 MMHG | HEIGHT: 69 IN | BODY MASS INDEX: 22.96 KG/M2 | HEART RATE: 72 BPM

## 2025-06-05 PROCEDURE — 72110 X-RAY EXAM L-2 SPINE 4/>VWS: CPT

## 2025-06-05 PROCEDURE — 99024 POSTOP FOLLOW-UP VISIT: CPT

## 2025-06-06 ENCOUNTER — TRANSCRIPTION ENCOUNTER (OUTPATIENT)
Age: 70
End: 2025-06-06

## 2025-06-11 ENCOUNTER — APPOINTMENT (OUTPATIENT)
Dept: DERMATOLOGY | Facility: CLINIC | Age: 70
End: 2025-06-11
Payer: MEDICARE

## 2025-06-11 ENCOUNTER — TRANSCRIPTION ENCOUNTER (OUTPATIENT)
Age: 70
End: 2025-06-11

## 2025-06-11 PROBLEM — L28.0 LSC (LICHEN SIMPLEX CHRONICUS): Status: ACTIVE | Noted: 2025-06-11

## 2025-06-11 PROCEDURE — 17262 DSTRJ MAL LES T/A/L 1.1-2.0: CPT

## 2025-06-11 PROCEDURE — 11900 INJECT SKIN LESIONS </W 7: CPT | Mod: 59

## 2025-06-12 ENCOUNTER — APPOINTMENT (OUTPATIENT)
Dept: INTERNAL MEDICINE | Facility: CLINIC | Age: 70
End: 2025-06-12
Payer: MEDICARE

## 2025-06-12 VITALS
HEART RATE: 72 BPM | OXYGEN SATURATION: 96 % | DIASTOLIC BLOOD PRESSURE: 90 MMHG | TEMPERATURE: 97.8 F | WEIGHT: 155 LBS | HEIGHT: 69 IN | SYSTOLIC BLOOD PRESSURE: 150 MMHG | BODY MASS INDEX: 22.96 KG/M2

## 2025-06-12 PROBLEM — C68.9 UROTHELIAL CANCER: Status: ACTIVE | Noted: 2025-06-12

## 2025-06-12 PROBLEM — C64.9 RENAL CARCINOMA: Status: ACTIVE | Noted: 2025-06-12

## 2025-06-12 PROCEDURE — G0438: CPT

## 2025-06-12 RX ORDER — BUPROPION HYDROCHLORIDE 300 MG/1
300 TABLET, EXTENDED RELEASE ORAL
Qty: 90 | Refills: 3 | Status: ACTIVE | COMMUNITY
Start: 2025-06-12

## 2025-06-17 ENCOUNTER — APPOINTMENT (OUTPATIENT)
Dept: INTERNAL MEDICINE | Facility: CLINIC | Age: 70
End: 2025-06-17

## 2025-06-25 ENCOUNTER — APPOINTMENT (OUTPATIENT)
Dept: DERMATOLOGY | Facility: CLINIC | Age: 70
End: 2025-06-25
Payer: MEDICARE

## 2025-06-25 PROBLEM — C44.529 SQUAMOUS CELL CARCINOMA OF SKIN OF CHEST: Status: ACTIVE | Noted: 2025-06-11

## 2025-06-25 PROCEDURE — 99212 OFFICE O/P EST SF 10 MIN: CPT

## 2025-06-25 RX ORDER — METHOCARBAMOL 500 MG/1
500 TABLET, FILM COATED ORAL 3 TIMES DAILY
Qty: 30 | Refills: 1 | Status: ACTIVE | COMMUNITY
Start: 2025-06-25 | End: 1900-01-01

## 2025-07-10 ENCOUNTER — APPOINTMENT (OUTPATIENT)
Dept: ORTHOPEDIC SURGERY | Facility: CLINIC | Age: 70
End: 2025-07-10
Payer: MEDICARE

## 2025-07-10 VITALS
BODY MASS INDEX: 22.96 KG/M2 | DIASTOLIC BLOOD PRESSURE: 76 MMHG | HEART RATE: 87 BPM | SYSTOLIC BLOOD PRESSURE: 107 MMHG | WEIGHT: 155 LBS | HEIGHT: 69 IN | OXYGEN SATURATION: 97 % | TEMPERATURE: 98.2 F

## 2025-07-10 PROCEDURE — 99024 POSTOP FOLLOW-UP VISIT: CPT

## 2025-07-14 ENCOUNTER — TRANSCRIPTION ENCOUNTER (OUTPATIENT)
Age: 70
End: 2025-07-14

## 2025-07-16 ENCOUNTER — TRANSCRIPTION ENCOUNTER (OUTPATIENT)
Age: 70
End: 2025-07-16

## 2025-07-28 ENCOUNTER — TRANSCRIPTION ENCOUNTER (OUTPATIENT)
Age: 70
End: 2025-07-28

## 2025-07-29 ENCOUNTER — APPOINTMENT (OUTPATIENT)
Dept: MRI IMAGING | Facility: CLINIC | Age: 70
End: 2025-07-29

## 2025-07-30 ENCOUNTER — TRANSCRIPTION ENCOUNTER (OUTPATIENT)
Age: 70
End: 2025-07-30

## 2025-08-01 ENCOUNTER — NON-APPOINTMENT (OUTPATIENT)
Age: 70
End: 2025-08-01

## 2025-08-07 ENCOUNTER — TRANSCRIPTION ENCOUNTER (OUTPATIENT)
Age: 70
End: 2025-08-07

## 2025-08-07 ENCOUNTER — APPOINTMENT (OUTPATIENT)
Dept: ORTHOPEDIC SURGERY | Facility: CLINIC | Age: 70
End: 2025-08-07
Payer: MEDICARE

## 2025-08-07 DIAGNOSIS — M48.02 SPINAL STENOSIS, CERVICAL REGION: ICD-10-CM

## 2025-08-07 PROCEDURE — 99212 OFFICE O/P EST SF 10 MIN: CPT

## 2025-08-16 ENCOUNTER — APPOINTMENT (OUTPATIENT)
Dept: MRI IMAGING | Facility: HOSPITAL | Age: 70
End: 2025-08-16

## 2025-08-22 ENCOUNTER — APPOINTMENT (OUTPATIENT)
Dept: INTERNAL MEDICINE | Facility: CLINIC | Age: 70
End: 2025-08-22

## 2025-08-28 ENCOUNTER — APPOINTMENT (OUTPATIENT)
Dept: INTERNAL MEDICINE | Facility: CLINIC | Age: 70
End: 2025-08-28

## 2025-09-08 ENCOUNTER — APPOINTMENT (OUTPATIENT)
Dept: ORTHOPEDIC SURGERY | Facility: CLINIC | Age: 70
End: 2025-09-08

## 2025-09-11 ENCOUNTER — TRANSCRIPTION ENCOUNTER (OUTPATIENT)
Age: 70
End: 2025-09-11

## (undated) DEVICE — DRAPE 1/2 SHEET 40X57"

## (undated) DEVICE — SUT QUILL MONODERM 2-0 60CM 24MM UNDYED

## (undated) DEVICE — SUT VICRYL 1 36" CTB-1 UNDYED

## (undated) DEVICE — DRAPE GENERAL ENDOSCOPY

## (undated) DEVICE — DRILL BIT STRYKER PRECISION NEURO 3X3.8MM

## (undated) DEVICE — FRAZIER SUCTION TIP 12FR

## (undated) DEVICE — DRAIN JACKSON PRATT 3 SPRING RESERVOIR W 10FR PVC DRAIN

## (undated) DEVICE — FRAZIER SUCTION TIP 10FR

## (undated) DEVICE — SPONGE PEANUT AUTO COUNT

## (undated) DEVICE — PREP DURAPREP 26CC

## (undated) DEVICE — ELCTR AQUAMANTYS BIPOLAR SEALER 6.0

## (undated) DEVICE — SUT VICRYL 2-0 27" CT-1 UNDYED

## (undated) DEVICE — STRYKER PULSE LAVAGE WITH BONE CLEANING TIP

## (undated) DEVICE — WARMING BLANKET UPPER ADULT

## (undated) DEVICE — DRSG GAUZE MOISTURIZER 0.5 OZ 4X8

## (undated) DEVICE — DRAPE BACK TABLE COVER 80X90"

## (undated) DEVICE — PREP CHLOROHEXIDINE 4% 118CC KIT

## (undated) DEVICE — SUT VICRYL 2-0 27" SH UNDYED

## (undated) DEVICE — Device

## (undated) DEVICE — SUT STRATAFIX SYMMETRIC PDS 1 45CM OS-6

## (undated) DEVICE — GLV 9 PROTEXIS ORTHO (BROWN)

## (undated) DEVICE — DRAPE 3/4 SHEET 52X76"

## (undated) DEVICE — PACK SPINE

## (undated) DEVICE — ELCTR STRYKER NEPTUNE SMOKE EVACUATION PENCIL (GREEN)

## (undated) DEVICE — BUR STRYKER MULTI FLUTE ROUND 5MM

## (undated) DEVICE — VENODYNE/SCD SLEEVE CALF MEDIUM